# Patient Record
Sex: FEMALE | Race: BLACK OR AFRICAN AMERICAN | RURAL
[De-identification: names, ages, dates, MRNs, and addresses within clinical notes are randomized per-mention and may not be internally consistent; named-entity substitution may affect disease eponyms.]

---

## 2023-04-03 ENCOUNTER — OFFICE VISIT (OUTPATIENT)
Dept: INTERNAL MEDICINE CLINIC | Age: 48
End: 2023-04-03
Payer: MEDICAID

## 2023-04-03 DIAGNOSIS — Z13.29 SCREENING FOR HYPOTHYROIDISM: ICD-10-CM

## 2023-04-03 DIAGNOSIS — E55.9 VITAMIN D DEFICIENCY: ICD-10-CM

## 2023-04-03 DIAGNOSIS — R03.0 ELEVATED BLOOD PRESSURE READING: ICD-10-CM

## 2023-04-03 DIAGNOSIS — Z13.220 SCREENING FOR HYPERLIPIDEMIA: ICD-10-CM

## 2023-04-03 DIAGNOSIS — Z11.59 NEED FOR HEPATITIS C SCREENING TEST: ICD-10-CM

## 2023-04-03 DIAGNOSIS — F32.A DEPRESSION, UNSPECIFIED DEPRESSION TYPE: ICD-10-CM

## 2023-04-03 DIAGNOSIS — Z76.89 ESTABLISHING CARE WITH NEW DOCTOR, ENCOUNTER FOR: Primary | ICD-10-CM

## 2023-04-03 DIAGNOSIS — Z13.0 SCREENING FOR DEFICIENCY ANEMIA: ICD-10-CM

## 2023-04-03 DIAGNOSIS — N89.8 VAGINAL DRYNESS: ICD-10-CM

## 2023-04-03 LAB — HBA1C MFR BLD HPLC: 5.7 %

## 2023-04-03 PROCEDURE — 99204 OFFICE O/P NEW MOD 45 MIN: CPT | Performed by: NURSE PRACTITIONER

## 2023-04-03 PROCEDURE — 83036 HEMOGLOBIN GLYCOSYLATED A1C: CPT | Performed by: NURSE PRACTITIONER

## 2023-04-03 RX ORDER — SERTRALINE HYDROCHLORIDE 25 MG/1
25 TABLET, FILM COATED ORAL DAILY
Qty: 30 TABLET | Refills: 1 | Status: SHIPPED | OUTPATIENT
Start: 2023-04-03 | End: 2023-06-02

## 2023-04-03 RX ORDER — KETOCONAZOLE 20 MG/G
CREAM TOPICAL
COMMUNITY

## 2023-04-03 RX ORDER — DESONIDE 0.5 MG/G
CREAM TOPICAL 2 TIMES DAILY
COMMUNITY
Start: 2023-01-09 | End: 2024-01-09

## 2023-04-03 RX ORDER — ERGOCALCIFEROL 1.25 MG/1
CAPSULE ORAL
COMMUNITY

## 2023-04-03 RX ORDER — CLOBETASOL PROPIONATE 0.5 MG/G
OINTMENT TOPICAL 2 TIMES DAILY
COMMUNITY
Start: 2023-01-06

## 2023-04-03 NOTE — PATIENT INSTRUCTIONS
It was a pleasure to meet you today. Please have labs drawn at [facility] as discussed. Referral to Gynecology placed. Thank you for choosing [address].     Jodi Ferrara NP

## 2023-04-03 NOTE — PROGRESS NOTES
Tiara Sagastume (: 1975) is a 46 y.o. female is here for evaluation of the following chief complaint(s): Establish Care and Menopause (Having trouble with vaginal dryness - hot flashes)        Subjective/Objective:   Amada Castaneda was seen today for Establish Care and Menopause (Having trouble with vaginal dryness - hot flashes)  Pt denies any complaints today except vaginal dryness. Pt seeing derm and dx with mycosis fungoides. Pt on creams which pt states have helped. Pt denies any Pmhx otherwise. PT denies any medications. Upon speaking with patient, pt reports depression with very occasional vague thoughts of wishing she was not here but nothing planned or persistent. Will check labs and start on zoloft and vaginal estrogen with Gyn referral.     Review of Systems   Constitutional: Negative. HENT: Negative. Eyes: Negative. Respiratory: Negative. Cardiovascular: Negative. Gastrointestinal: Negative. Genitourinary:  Negative for dysuria, flank pain, frequency, hematuria and urgency. Vaginal dryness       Musculoskeletal: Negative. Skin: Negative. Neurological: Negative. Psychiatric/Behavioral:  Positive for depression. Negative for hallucinations, memory loss, substance abuse and suicidal ideas. The patient is not nervous/anxious and does not have insomnia. Physical Exam  Constitutional:       General: She is not in acute distress. Appearance: Normal appearance. She is obese. She is not ill-appearing or toxic-appearing. HENT:      Head: Normocephalic and atraumatic. Comments:       Right Ear: Tympanic membrane, ear canal and external ear normal.      Left Ear: Tympanic membrane, ear canal and external ear normal.      Ears:      Comments: Denies difficulty hearing. Nose: Nose normal.      Mouth/Throat:      Lips: Pink. Mouth: Mucous membranes are moist.      Pharynx: Oropharynx is clear.       Comments: Pt has dentures that she reports are well fitting . Eyes:      General: Lids are normal.      Extraocular Movements: Extraocular movements intact. Conjunctiva/sclera: Conjunctivae normal.      Comments: PT has skin tag to upper right eyelid. PT denies any acute vision changes. Neck:      Thyroid: No thyroid mass, thyromegaly or thyroid tenderness. Vascular: No carotid bruit or JVD. Trachea: Trachea and phonation normal.   Cardiovascular:      Rate and Rhythm: Normal rate and regular rhythm. Pulses: Normal pulses. Radial pulses are 2+ on the right side. Heart sounds: Normal heart sounds, S1 normal and S2 normal.   Pulmonary:      Effort: Pulmonary effort is normal.      Breath sounds: Normal breath sounds and air entry. Abdominal:      General: Abdomen is flat. Bowel sounds are normal. There is no distension. Palpations: There is no shifting dullness or fluid wave. Tenderness: There is no abdominal tenderness. There is no right CVA tenderness or left CVA tenderness. Hernia: No hernia is present. Musculoskeletal:         General: Normal range of motion. Cervical back: Full passive range of motion without pain and normal range of motion. No spinous process tenderness or muscular tenderness. Right lower leg: No edema. Left lower leg: No edema. Lymphadenopathy:      Cervical: No cervical adenopathy. Skin:     General: Skin is warm and dry. Capillary Refill: Capillary refill takes less than 2 seconds. Neurological:      General: No focal deficit present. Mental Status: She is alert and oriented to person, place, and time. Deep Tendon Reflexes:      Reflex Scores:       Bicep reflexes are 2+ on the right side and 2+ on the left side. Patellar reflexes are 2+ on the right side and 2+ on the left side. Psychiatric:         Attention and Perception: Attention and perception normal.         Mood and Affect: Mood is depressed. Mood is not anxious or elated.  Affect is not labile, blunt, flat, angry, tearful or inappropriate. Speech: Speech normal.         Behavior: Behavior normal. Behavior is cooperative. Thought Content: Thought content normal.         Cognition and Memory: Cognition and memory normal.         Judgment: Judgment normal.        BP (!) 144/70 (BP 1 Location: Right arm, BP Patient Position: Sitting, BP Cuff Size: Large adult)   Pulse 80   Temp 98.5 °F (36.9 °C) (Temporal)   Resp 20   Ht 5' 2\" (1.575 m)   Wt 241 lb (109.3 kg)   LMP 02/28/2023 (Approximate)   SpO2 98%   BMI 44.08 kg/m²      No results found for any previous visit. History reviewed. No pertinent surgical history. No past medical history on file. Current Outpatient Medications   Medication Instructions    clobetasoL (TEMOVATE) 0.05 % ointment Topical, 2 TIMES DAILY    conjugated estrogens (PREMARIN) 0.5 g, Vaginal, DAILY    desonide (TRIDESILON) 0.05 % cream Topical, 2 TIMES DAILY    ergocalciferol (ERGOCALCIFEROL) 1,250 mcg (50,000 unit) capsule Vitamin D2 1,250 mcg (50,000 unit) capsule   take 1 capsule by mouth every week    ketoconazole (NIZORAL) 2 % topical cream ketoconazole 2 % topical cream   apply to affected area twice a day    sertraline (ZOLOFT) 25 mg, Oral, DAILY        Assessment/Plan:     The above diagnosis is a chronic problem. We discussed expected course, resolution, and complications of diagnosis in detail. I advised her to call back or return to office if symptoms worsen/change/persist.        ICD-10-CM ICD-9-CM    1. Establishing care with new doctor, encounter for  Z76.89 V65.8       2. BMI 40.0-44.9, adult (HCC)  M81.57 Y72.25 METABOLIC PANEL, COMPREHENSIVE      TSH 3RD GENERATION      METABOLIC PANEL, COMPREHENSIVE      TSH 3RD GENERATION      3.  Need for hepatitis C screening test  Z11.59 V73.89 HEPATITIS C AB, RFLX TO QT BY PCR      HEPATITIS C AB, RFLX TO QT BY PCR      4. Screening for hyperlipidemia  Z13.220 V77.91 LIPID PANEL LIPID PANEL      5. Vitamin D deficiency  E55.9 268.9 VITAMIN D, 25 HYDROXY      VITAMIN D, 25 HYDROXY      6. Screening for deficiency anemia  Z13.0 V78.1 CBC WITH AUTOMATED DIFF      CBC WITH AUTOMATED DIFF      7. Elevated blood pressure reading  R03.0 796.2 URINALYSIS W/ RFLX MICROSCOPIC      URINALYSIS W/ RFLX MICROSCOPIC      8. Screening for hypothyroidism  Z13.29 V77.0 TSH 3RD GENERATION      TSH 3RD GENERATION      9. Vaginal dryness  N89.8 625.8 REFERRAL TO GYNECOLOGY      10. Depression, unspecified depression type  F32. A 311          Return in about 4 weeks (around 5/1/2023). An electronic signature was used to authenticate this note.   -- Chang Powell, NP

## 2023-04-03 NOTE — PROGRESS NOTES
Chief Complaint   Patient presents with    [facility] Care    Menopause     Having trouble with vaginal dryness - hot flashes           3 most recent PHQ Screens 4/3/2023   Little interest or pleasure in doing things More than half the days   Feeling down, depressed, irritable, or hopeless Several days   Total Score PHQ 2 3   Trouble falling or staying asleep, or sleeping too much Nearly every day   Feeling tired or having little energy Nearly every day   Poor appetite, weight loss, or overeating Several days   Feeling bad about yourself - or that you are a failure or have let yourself or your family down Several days   Trouble concentrating on things such as school, work, reading, or watching TV Several days   Moving or speaking so slowly that other people could have noticed; or the opposite being so fidgety that others notice Not at all   Thoughts of being better off dead, or hurting yourself in some way Not at all   PHQ 9 Score 12   How difficult have these problems made it for you to do your work, take care of your home and get along with others Not difficult at all       Abuse Screening Questionnaire 4/3/2023   Do you ever feel afraid of your partner? N   Are you in a relationship with someone who physically or mentally threatens you? N   Is it safe for you to go home?  Y       ADL Assessment 4/3/2023   Feeding yourself No Help Needed   Getting from bed to chair No Help Needed   Getting dressed No Help Needed   Bathing or showering No Help Needed   Walk across the room (includes cane/walker) No Help Needed   Using the telphone No Help Needed   Taking your medications No Help Needed   Preparing meals No Help Needed   Managing money (expenses/bills) No Help Needed   Moderately strenuous housework (laundry) No Help Needed   Shopping for personal items (toiletries/medicines) No Help Needed   Shopping for groceries No Help Needed   Driving No Help Needed   Climbing a flight of stairs No Help Needed   Getting to places beyond walking distances No Help Needed

## 2023-04-13 PROBLEM — E78.5 HYPERLIPIDEMIA, UNSPECIFIED HYPERLIPIDEMIA TYPE: Status: ACTIVE | Noted: 2023-04-13

## 2023-04-13 PROBLEM — I10 PRIMARY HYPERTENSION: Status: ACTIVE | Noted: 2023-04-13

## 2023-04-13 PROBLEM — E55.9 VITAMIN D DEFICIENCY: Status: ACTIVE | Noted: 2023-04-13

## 2023-06-06 ENCOUNTER — NURSE TRIAGE (OUTPATIENT)
Dept: OTHER | Facility: CLINIC | Age: 48
End: 2023-06-06

## 2023-06-06 NOTE — TELEPHONE ENCOUNTER
Received call from [place] at [facility], caller not on line. Complaint: nausea, fatigue, frequent urination    Practice Name: Elena Polanco    Caller's telephone number verified as [phone]    Unsuccessful attempt to re-connect with caller via phone, left message for return call to office            Reason for Disposition   Message left on unidentified voice mail. Phone number verified.     Protocols used: No Contact or Duplicate Contact Call-ADULT-[place]

## 2023-06-16 ENCOUNTER — TELEPHONE (OUTPATIENT)
Facility: CLINIC | Age: 48
End: 2023-06-16

## 2023-06-16 PROBLEM — R73.03 PREDIABETES: Status: ACTIVE | Noted: 2023-06-16

## 2023-07-25 ENCOUNTER — TELEPHONE (OUTPATIENT)
Age: 48
End: 2023-07-25

## 2023-07-25 NOTE — TELEPHONE ENCOUNTER
Called pt to rs her appt with Dr Lisa Allison on 8/3 at 1:40pm due to Dr Lisa Allison not being in. No answer, left vm to return call.

## 2023-12-29 ENCOUNTER — TELEMEDICINE (OUTPATIENT)
Facility: CLINIC | Age: 48
End: 2023-12-29
Payer: MEDICAID

## 2023-12-29 DIAGNOSIS — R73.03 PREDIABETES: ICD-10-CM

## 2023-12-29 DIAGNOSIS — U07.1 COVID: Primary | ICD-10-CM

## 2023-12-29 PROCEDURE — 99214 OFFICE O/P EST MOD 30 MIN: CPT | Performed by: FAMILY MEDICINE

## 2023-12-29 NOTE — PROGRESS NOTES
Chief Complaint   Patient presents with    Positive For Covid-19     Tested positive for Covid yesterday    Head Congestion     With fever, chills, body aches - symptoms started on 12/26/2023

## 2023-12-29 NOTE — PROGRESS NOTES
Subjective:   Dinorah Robles is a 46 y.o. female who complains of cough described as productive, myalgias, chills, and yellow nasal discharge for 3 days, stable since that time. She denies a history of wheezing and sore throat. Evaluation to date: home covid test +. X 2  Treatment to date: OTC products. Patient does not smoke cigarettes. Relevant PMH: pre DM and BMI > 40    No Known Allergies  Patient Active Problem List   Diagnosis    BMI 40.0-44.9, adult ([facility])    Vitamin D deficiency    Hyperlipidemia, unspecified hyperlipidemia type    Primary hypertension    Prediabetes           Current Outpatient Medications   Medication Sig Dispense Refill    atorvastatin (LIPITOR) 20 MG tablet Take 1 tablet by mouth daily 90 tablet 0    sertraline (ZOLOFT) 25 MG tablet Take 1 tablet by mouth daily 90 tablet 0    hydroCHLOROthiazide (HYDRODIURIL) 12.5 MG tablet Take 1 tablet by mouth daily 90 tablet 0    PREMARIN 0.625 MG/GM CREA vaginal cream INSERT 1/2 (ONE-HALF) GRAM VAGINALLY ONCE DAILY FOR 60 DAYS 3 g 1    vitamin D (ERGOCALCIFEROL) 1.25 MG (30489 UT) CAPS capsule Take 1 capsule by mouth once a week 12 capsule 0    EPINEPHrine (EPIPEN) 0.3 MG/0.3ML SOAJ injection Inject 0.3 mLs into the muscle once for 1 dose 0.3 mL 0    clobetasol (TEMOVATE) 0.05 % ointment Apply topically 2 times daily      diphenhydrAMINE (BENADRYL) 25 MG capsule Take 1-2 capsules every 6 hr for the next 3 days, then as needed thereafter       No current facility-administered medications for this visit. No Known Allergies  Social History     Tobacco Use    Smoking status: Never    Smokeless tobacco: Never   Substance Use Topics    Alcohol use: Yes        Review of Systems  Pertinent items are noted in HPI. Objective:       WD WN female NAD  Wears a mask    Assessment/Plan:      Diagnosis Orders   1. COVID  nirmatrelvir/ritonavir 300/100 (PAXLOVID) 20 x 150 MG & 10 x 100MG TBPK      2.  Prediabetes          Orders Placed This Encounter

## 2024-01-08 ENCOUNTER — NURSE TRIAGE (OUTPATIENT)
Dept: OTHER | Facility: CLINIC | Age: 49
End: 2024-01-08

## 2024-01-08 NOTE — TELEPHONE ENCOUNTER
[facility]    Received call from Sona at [facility] with Red Flag Complaint.    Subjective: Caller states \"For about 3 or 4 months it started out it was real red like blood shot red. The leg will swell and go down and swell and go down. When I put lotion on my leg, the place with the spots on it, it will tingle so I try not to touch the area. It looks like a red spider web. I've been having shortness of breath for a while. I talked to the doctor about it and it's not any worse. I use my moms albuterol inhaler some times and that helps. \"     Current Symptoms: shortness of breath with exertion, red spider web area to shin and calf on one leg     Onset: a few months ago; unchanged    Associated Symptoms: NA    Pain Severity: denies    Temperature: denies     What has been tried: elevation    LMP: NA Pregnant: No    Recommended disposition: See in Office Today or Tomorrow    Care advice provided, patient verbalizes understanding; denies any other questions or concerns; instructed to call back for any new or worsening symptoms.    Patient/Caller agrees with recommended disposition; writer provided warm transfer to Shira at [facility] for appointment scheduling    Attention Provider:  Thank you for allowing me to participate in the care of your patient.  The patient was connected to triage in response to information provided to the [facility].  Please do not respond through this encounter as the response is not   Reason for Disposition   Localized pain, redness or hard lump along vein    Protocols used: Leg Pain-ADULT-[place]

## 2024-01-09 ENCOUNTER — APPOINTMENT (OUTPATIENT)
Facility: HOSPITAL | Age: 49
End: 2024-01-09
Payer: MEDICAID

## 2024-01-09 ENCOUNTER — HOSPITAL ENCOUNTER (EMERGENCY)
Facility: HOSPITAL | Age: 49
Discharge: HOME | End: 2024-01-09
Attending: EMERGENCY MEDICINE
Payer: MEDICAID

## 2024-01-09 VITALS
BODY MASS INDEX: 43.9 KG/M2 | OXYGEN SATURATION: 99 % | HEART RATE: 80 BPM | TEMPERATURE: 97.7 F | WEIGHT: 240 LBS | SYSTOLIC BLOOD PRESSURE: 153 MMHG | DIASTOLIC BLOOD PRESSURE: 77 MMHG | RESPIRATION RATE: 18 BRPM

## 2024-01-09 DIAGNOSIS — M79.662 PAIN OF LEFT CALF: Primary | ICD-10-CM

## 2024-01-09 DIAGNOSIS — M79.89 SWELLING OF EXTREMITY, LEFT: ICD-10-CM

## 2024-01-09 LAB
ANION GAP SERPL CALC-SCNC: 2 MMOL/L (ref 5–15)
BASOPHILS # BLD: 0.1 K/UL (ref 0–0.1)
BASOPHILS NFR BLD: 1 % (ref 0–1)
BUN SERPL-MCNC: 11 MG/DL (ref 6–20)
BUN/CREAT SERPL: 14 (ref 12–20)
CALCIUM SERPL-MCNC: 9.4 MG/DL (ref 8.5–10.1)
CHLORIDE SERPL-SCNC: 112 MMOL/L (ref 97–108)
CO2 SERPL-SCNC: 25 MMOL/L (ref 21–32)
CREAT SERPL-MCNC: 0.81 MG/DL (ref 0.55–1.02)
DIFFERENTIAL METHOD BLD: ABNORMAL
EKG ATRIAL RATE: 74 BPM
EKG DIAGNOSIS: NORMAL
EKG P AXIS: 55 DEGREES
EKG P-R INTERVAL: 142 MS
EKG Q-T INTERVAL: 376 MS
EKG QRS DURATION: 70 MS
EKG QTC CALCULATION (BAZETT): 417 MS
EKG R AXIS: 109 DEGREES
EKG T AXIS: 41 DEGREES
EKG VENTRICULAR RATE: 74 BPM
EOSINOPHIL # BLD: 0.2 K/UL (ref 0–0.4)
EOSINOPHIL NFR BLD: 3 % (ref 0–7)
ERYTHROCYTE [DISTWIDTH] IN BLOOD BY AUTOMATED COUNT: 13.2 % (ref 11.5–14.5)
GLUCOSE SERPL-MCNC: 102 MG/DL (ref 65–100)
HCT VFR BLD AUTO: 37.9 % (ref 35–47)
HGB BLD-MCNC: 12.2 G/DL (ref 11.5–16)
IMM GRANULOCYTES # BLD AUTO: 0.1 K/UL (ref 0–0.04)
IMM GRANULOCYTES NFR BLD AUTO: 1 % (ref 0–0.5)
LYMPHOCYTES # BLD: 2.8 K/UL (ref 0.8–3.5)
LYMPHOCYTES NFR BLD: 33 % (ref 12–49)
MCH RBC QN AUTO: 28.4 PG (ref 26–34)
MCHC RBC AUTO-ENTMCNC: 32.2 G/DL (ref 30–36.5)
MCV RBC AUTO: 88.3 FL (ref 80–99)
MONOCYTES # BLD: 0.9 K/UL (ref 0–1)
MONOCYTES NFR BLD: 10 % (ref 5–13)
NEUTS SEG # BLD: 4.5 K/UL (ref 1.8–8)
NEUTS SEG NFR BLD: 52 % (ref 32–75)
NRBC # BLD: 0 K/UL (ref 0–0.01)
NRBC BLD-RTO: 0 PER 100 WBC
PLATELET # BLD AUTO: 334 K/UL (ref 150–400)
PMV BLD AUTO: 11.1 FL (ref 8.9–12.9)
POTASSIUM SERPL-SCNC: 3.9 MMOL/L (ref 3.5–5.1)
RBC # BLD AUTO: 4.29 M/UL (ref 3.8–5.2)
SODIUM SERPL-SCNC: 139 MMOL/L (ref 136–145)
TROPONIN I SERPL HS-MCNC: 5 NG/L (ref 0–51)
WBC # BLD AUTO: 8.5 K/UL (ref 3.6–11)

## 2024-01-09 PROCEDURE — 80048 BASIC METABOLIC PNL TOTAL CA: CPT

## 2024-01-09 PROCEDURE — 93971 EXTREMITY STUDY: CPT

## 2024-01-09 PROCEDURE — 84484 ASSAY OF TROPONIN QUANT: CPT

## 2024-01-09 PROCEDURE — 85025 COMPLETE CBC W/AUTO DIFF WBC: CPT

## 2024-01-09 PROCEDURE — 36415 COLL VENOUS BLD VENIPUNCTURE: CPT

## 2024-01-09 PROCEDURE — 99284 EMERGENCY DEPT VISIT MOD MDM: CPT

## 2024-01-09 PROCEDURE — 93005 ELECTROCARDIOGRAM TRACING: CPT

## 2024-01-09 RX ORDER — ALBUTEROL SULFATE 90 UG/1
2 AEROSOL, METERED RESPIRATORY (INHALATION) 4 TIMES DAILY PRN
Qty: 54 G | Refills: 0 | Status: SHIPPED | OUTPATIENT
Start: 2024-01-09

## 2024-01-09 ASSESSMENT — PAIN - FUNCTIONAL ASSESSMENT: PAIN_FUNCTIONAL_ASSESSMENT: NONE - DENIES PAIN

## 2024-01-09 ASSESSMENT — LIFESTYLE VARIABLES
HOW MANY STANDARD DRINKS CONTAINING ALCOHOL DO YOU HAVE ON A TYPICAL DAY: PATIENT DOES NOT DRINK
HOW OFTEN DO YOU HAVE A DRINK CONTAINING ALCOHOL: NEVER

## 2024-01-09 ASSESSMENT — ENCOUNTER SYMPTOMS
SHORTNESS OF BREATH: 0
ABDOMINAL PAIN: 0

## 2024-01-09 NOTE — ED NOTES
I have reviewed the provider's instructions with the patient, answering all questions to her satisfaction. Pt ambulatory out of ED.

## 2024-01-09 NOTE — DISCHARGE INSTRUCTIONS
You were seen and evaluated in the emergency department for pain and swelling in your left calf.  You had an ultrasound performed that did not show any emergent findings.  You also had blood work and EKG performed that did not show any emergent findings.  You can be discharged home.    Follow-up with your primary care doctor.  Follow-up with dermatology for the rash on your leg. Schedule an appointment with Dr. Greg Carrillo with pulmonary medicine regarding your asthma.  An albuterol inhaler has been sent to your preferred pharmacy for you.   his inhaler and use as needed for his symptoms.    Return to the emergency department if develop new or worsening symptoms, or symptoms concerning to you.

## 2024-05-07 ENCOUNTER — TELEPHONE (OUTPATIENT)
Facility: CLINIC | Age: 49
End: 2024-05-07

## 2024-05-07 NOTE — TELEPHONE ENCOUNTER
Pt calling with UTI symptoms, she took a home uti test and it was positive. She would like an appt today but I told her there are no appts for the rest of the week. Pt can be reached at  [phone]

## 2024-05-07 NOTE — TELEPHONE ENCOUNTER
Called patient - she c/o lower abdominal pain and pain with urination and states she has had a very low grade fever X 2 days - also has a boil in her vaginal area she wants assessed - advised that if she is having these symptoms and her home UTI test showed positive results she will need to be evaluated and check her urine - appt scheduled for 5/8/2024 at 1015am  Cecilia Blackmon LPN 5/7/2024 5:42 PM

## 2024-11-08 RX ORDER — CONJUGATED ESTROGENS 0.62 MG/G
CREAM VAGINAL
Qty: 90 G | Refills: 0 | OUTPATIENT
Start: 2024-11-08

## 2024-11-25 RX ORDER — CONJUGATED ESTROGENS 0.62 MG/G
CREAM VAGINAL
Qty: 90 G | Refills: 0 | OUTPATIENT
Start: 2024-11-25

## 2024-11-25 NOTE — TELEPHONE ENCOUNTER
PCP: Joslyn Benson APRN - NP    LAST APPT:12/29/2023    No future appointments.    Requested Prescriptions     Pending Prescriptions Disp Refills    PREMARIN 0.625 MG/GM CREA vaginal cream [Pharmacy Med Name: Premarin 0.625 MG/GM Vaginal Cream] 90 g 0     Sig: INSERT 1/2 GRAM VAGINALLY ONCE DAILY FOR 60 DAYS

## 2025-07-30 ENCOUNTER — TELEPHONE (OUTPATIENT)
Facility: CLINIC | Age: 50
End: 2025-07-30